# Patient Record
Sex: FEMALE | Race: BLACK OR AFRICAN AMERICAN | Employment: UNEMPLOYED | URBAN - METROPOLITAN AREA
[De-identification: names, ages, dates, MRNs, and addresses within clinical notes are randomized per-mention and may not be internally consistent; named-entity substitution may affect disease eponyms.]

---

## 2017-11-27 ENCOUNTER — HOSPITAL ENCOUNTER (EMERGENCY)
Age: 47
Discharge: HOME | End: 2017-11-27
Attending: EMERGENCY MEDICINE | Admitting: EMERGENCY MEDICINE
Payer: MEDICAID

## 2017-11-27 ENCOUNTER — APPOINTMENT (OUTPATIENT)
Dept: GENERAL RADIOLOGY | Age: 47
End: 2017-11-27
Attending: NURSE PRACTITIONER
Payer: MEDICAID

## 2017-11-27 ENCOUNTER — APPOINTMENT (OUTPATIENT)
Dept: CT IMAGING | Age: 47
End: 2017-11-27
Attending: NURSE PRACTITIONER
Payer: MEDICAID

## 2017-11-27 VITALS
HEART RATE: 70 BPM | TEMPERATURE: 97.3 F | HEIGHT: 61 IN | DIASTOLIC BLOOD PRESSURE: 97 MMHG | SYSTOLIC BLOOD PRESSURE: 147 MMHG | BODY MASS INDEX: 40.59 KG/M2 | OXYGEN SATURATION: 100 % | WEIGHT: 215 LBS | RESPIRATION RATE: 18 BRPM

## 2017-11-27 DIAGNOSIS — R03.0 ELEVATED BLOOD PRESSURE READING: ICD-10-CM

## 2017-11-27 DIAGNOSIS — R42 DIZZINESS: Primary | ICD-10-CM

## 2017-11-27 PROBLEM — E66.01 OBESITY, MORBID (HCC): Status: ACTIVE | Noted: 2017-11-27

## 2017-11-27 LAB
ANION GAP SERPL CALC-SCNC: 6 MMOL/L (ref 3–18)
APPEARANCE UR: CLEAR
BASOPHILS # BLD: 0 K/UL (ref 0–0.06)
BASOPHILS NFR BLD: 0 % (ref 0–2)
BILIRUB UR QL: NEGATIVE
BUN SERPL-MCNC: 19 MG/DL (ref 7–18)
BUN/CREAT SERPL: 23 (ref 12–20)
CALCIUM SERPL-MCNC: 9.5 MG/DL (ref 8.5–10.1)
CHLORIDE SERPL-SCNC: 103 MMOL/L (ref 100–108)
CO2 SERPL-SCNC: 29 MMOL/L (ref 21–32)
COLOR UR: YELLOW
CREAT SERPL-MCNC: 0.82 MG/DL (ref 0.6–1.3)
DIFFERENTIAL METHOD BLD: NORMAL
EOSINOPHIL # BLD: 0.1 K/UL (ref 0–0.4)
EOSINOPHIL NFR BLD: 2 % (ref 0–5)
ERYTHROCYTE [DISTWIDTH] IN BLOOD BY AUTOMATED COUNT: 14.2 % (ref 11.6–14.5)
GLUCOSE SERPL-MCNC: 81 MG/DL (ref 74–99)
GLUCOSE UR STRIP.AUTO-MCNC: NEGATIVE MG/DL
HCT VFR BLD AUTO: 41.4 % (ref 35–45)
HGB BLD-MCNC: 13.8 G/DL (ref 12–16)
HGB UR QL STRIP: NEGATIVE
KETONES UR QL STRIP.AUTO: NEGATIVE MG/DL
LEUKOCYTE ESTERASE UR QL STRIP.AUTO: NEGATIVE
LYMPHOCYTES # BLD: 2.3 K/UL (ref 0.9–3.6)
LYMPHOCYTES NFR BLD: 30 % (ref 21–52)
MCH RBC QN AUTO: 29.1 PG (ref 24–34)
MCHC RBC AUTO-ENTMCNC: 33.3 G/DL (ref 31–37)
MCV RBC AUTO: 87.2 FL (ref 74–97)
MONOCYTES # BLD: 0.5 K/UL (ref 0.05–1.2)
MONOCYTES NFR BLD: 7 % (ref 3–10)
NEUTS SEG # BLD: 4.9 K/UL (ref 1.8–8)
NEUTS SEG NFR BLD: 61 % (ref 40–73)
NITRITE UR QL STRIP.AUTO: NEGATIVE
PH UR STRIP: 5.5 [PH] (ref 5–8)
PLATELET # BLD AUTO: 233 K/UL (ref 135–420)
PMV BLD AUTO: 10.8 FL (ref 9.2–11.8)
POTASSIUM SERPL-SCNC: 3.2 MMOL/L (ref 3.5–5.5)
PROT UR STRIP-MCNC: NEGATIVE MG/DL
RBC # BLD AUTO: 4.75 M/UL (ref 4.2–5.3)
SODIUM SERPL-SCNC: 138 MMOL/L (ref 136–145)
SP GR UR REFRACTOMETRY: 1.01 (ref 1–1.03)
UROBILINOGEN UR QL STRIP.AUTO: 0.2 EU/DL (ref 0.2–1)
WBC # BLD AUTO: 7.9 K/UL (ref 4.6–13.2)

## 2017-11-27 PROCEDURE — 85025 COMPLETE CBC W/AUTO DIFF WBC: CPT | Performed by: NURSE PRACTITIONER

## 2017-11-27 PROCEDURE — 96374 THER/PROPH/DIAG INJ IV PUSH: CPT

## 2017-11-27 PROCEDURE — 99285 EMERGENCY DEPT VISIT HI MDM: CPT

## 2017-11-27 PROCEDURE — 70450 CT HEAD/BRAIN W/O DYE: CPT

## 2017-11-27 PROCEDURE — 74011000258 HC RX REV CODE- 258: Performed by: NURSE PRACTITIONER

## 2017-11-27 PROCEDURE — 96361 HYDRATE IV INFUSION ADD-ON: CPT

## 2017-11-27 PROCEDURE — 74011250636 HC RX REV CODE- 250/636: Performed by: NURSE PRACTITIONER

## 2017-11-27 PROCEDURE — 81003 URINALYSIS AUTO W/O SCOPE: CPT | Performed by: NURSE PRACTITIONER

## 2017-11-27 PROCEDURE — 74011250637 HC RX REV CODE- 250/637: Performed by: NURSE PRACTITIONER

## 2017-11-27 PROCEDURE — 80048 BASIC METABOLIC PNL TOTAL CA: CPT | Performed by: NURSE PRACTITIONER

## 2017-11-27 PROCEDURE — 71010 XR CHEST PORT: CPT

## 2017-11-27 RX ORDER — MECLIZINE HYDROCHLORIDE 25 MG/1
25 TABLET ORAL
Qty: 12 TAB | Refills: 0 | Status: SHIPPED | OUTPATIENT
Start: 2017-11-27 | End: 2017-12-07

## 2017-11-27 RX ORDER — MECLIZINE HCL 12.5 MG 12.5 MG/1
25 TABLET ORAL
Status: COMPLETED | OUTPATIENT
Start: 2017-11-27 | End: 2017-11-27

## 2017-11-27 RX ORDER — POTASSIUM CHLORIDE 20 MEQ/1
60 TABLET, EXTENDED RELEASE ORAL
Status: COMPLETED | OUTPATIENT
Start: 2017-11-27 | End: 2017-11-27

## 2017-11-27 RX ORDER — ONDANSETRON 2 MG/ML
4 INJECTION INTRAMUSCULAR; INTRAVENOUS
Status: DISCONTINUED | OUTPATIENT
Start: 2017-11-27 | End: 2017-11-27

## 2017-11-27 RX ORDER — DIAZEPAM 10 MG/2ML
5 INJECTION INTRAMUSCULAR
Status: DISCONTINUED | OUTPATIENT
Start: 2017-11-27 | End: 2017-11-27

## 2017-11-27 RX ORDER — POTASSIUM CHLORIDE 20 MEQ/1
TABLET, EXTENDED RELEASE ORAL
Status: DISCONTINUED
Start: 2017-11-27 | End: 2017-11-27 | Stop reason: HOSPADM

## 2017-11-27 RX ADMIN — SODIUM CHLORIDE 1000 ML: 900 INJECTION, SOLUTION INTRAVENOUS at 10:12

## 2017-11-27 RX ADMIN — SODIUM CHLORIDE 1000 ML: 900 INJECTION, SOLUTION INTRAVENOUS at 07:45

## 2017-11-27 RX ADMIN — MECLIZINE 25 MG: 12.5 TABLET ORAL at 07:50

## 2017-11-27 RX ADMIN — POTASSIUM CHLORIDE 60 MEQ: 1500 TABLET, FILM COATED, EXTENDED RELEASE ORAL at 09:12

## 2017-11-27 RX ADMIN — PROMETHAZINE HYDROCHLORIDE 25 MG: 25 INJECTION INTRAMUSCULAR; INTRAVENOUS at 07:47

## 2017-11-27 NOTE — ED TRIAGE NOTES
Presents with dizziness, nausea, abdominal pain, chest pain and dysuria. Onset abdominal pain and chest pain this am.  Onset dizziness and dysuria one week.

## 2017-11-27 NOTE — DISCHARGE INSTRUCTIONS
[facility] Activation    Thank you for requesting access to [facility]. Please follow the instructions below to securely access and download your online medical record. [facility] allows you to send messages to your doctor, view your test results, renew your prescriptions, schedule appointments, and more. How Do I Sign Up? 1. In your internet browser, go to [email or web address]  2. Click on the First Time User? Click Here link in the Sign In box. You will be redirect to the New Member Sign Up page. 3. Enter your [facility] Access Code exactly as it appears below. You will not need to use this code after youve completed the sign-up process. If you do not sign up before the expiration date, you must request a new code. [facility] Access Code: [email or web address]  Expires: 2018 11:05 AM (This is the date your [facility] access code will )    4. Enter the last four digits of your Social Security Number (xxxx) and Date of Birth (mm/dd/yyyy) as indicated and click Submit. You will be taken to the next sign-up page. 5. Create a [facility] ID. This will be your [facility] login ID and cannot be changed, so think of one that is secure and easy to remember. 6. Create a [facility] password. You can change your password at any time. 7. Enter your Password Reset Question and Answer. This can be used at a later time if you forget your password. 8. Enter your e-mail address. You will receive e-mail notification when new information is available in [address]. 9. Click Sign Up. You can now view and download portions of your medical record. 10. Click the Download Summary menu link to download a portable copy of your medical information. Additional Information    If you have questions, please visit the Frequently Asked Questions section of the [employer] website at [email or web address] Remember, [facility] is NOT to be used for urgent needs. For medical emergencies, dial 911. Keep well hydrated.   Drink at least 2 liters of water daily. Follow up with your physician or the referal as provided by calling today for an appointment.

## 2017-11-27 NOTE — ED PROVIDER NOTES
HPI Comments: Amrita Jimenez is a 56years old female who presents to the ED with a c/o extreme dizziness over the past week. She has had several bouts of N&V, but denies protracted emesis. States she was in [place] visiting when it began, and thought it would go away when she came back to [place], but it has continued. She has not self-medicated this problem. Nothing has made it better or worse. Patient is a 56 y.o. female presenting with abdominal pain. The history is provided by the patient. History limited by: No communication barrier. Abdominal Pain    This is a new problem. Episode onset: one week. The problem occurs constantly. The problem has not changed since onset. Associated with: Pt makes no association. The pain is mild. Associated symptoms include nausea and vomiting. Pertinent negatives include no diarrhea. Nothing worsens the pain. The pain is relieved by nothing. Past Medical History:   Diagnosis Date    Hypertension        History reviewed. No pertinent surgical history. History reviewed. No pertinent family history. Social History     Social History    Marital status:      Spouse name: N/A    Number of children: N/A    Years of education: N/A     Occupational History    Not on file. Social History Main Topics    Smoking status: Former Smoker    Smokeless tobacco: Never Used    Alcohol use No    Drug use: No    Sexual activity: Not on file     Other Topics Concern    Not on file     Social History Narrative    No narrative on file         ALLERGIES: Ibuprofen and Tramadol    Review of Systems   Constitutional: Negative. HENT: Negative. Eyes: Negative. Respiratory: Positive for cough (productive). Cardiovascular: Negative. Gastrointestinal: Positive for abdominal pain, nausea and vomiting. Negative for diarrhea. Endocrine: Negative. Genitourinary: Negative. Musculoskeletal: Negative. Skin: Negative. Allergic/Immunologic: Negative. Neurological: Negative. Hematological: Negative. Psychiatric/Behavioral: Negative. Vitals:    11/27/17 0650 11/27/17 0652   BP:  (!) 143/91   Pulse: 63    Resp: 18    Temp: 97.3 °F (36.3 °C)    SpO2: 100%    Weight: 97.5 kg (215 lb)    Height: 5' 1\" (1.549 m)             Physical Exam   Constitutional: She is oriented to person, place, and time. She appears well-developed and well-nourished. No distress. HENT:   Head: Normocephalic and atraumatic. Eyes: EOM are normal. Pupils are equal, round, and reactive to light. Neck: Normal range of motion. Neck supple. Cardiovascular: Normal rate, regular rhythm, normal heart sounds and intact distal pulses. Pulmonary/Chest: Effort normal. No respiratory distress. She has wheezes. She has no rales. Abdominal: Soft. Bowel sounds are normal. She exhibits no distension. There is tenderness. There is no rebound and no guarding. Genitourinary:   Genitourinary Comments: NE   Musculoskeletal: Normal range of motion. Neurological: She is alert and oriented to person, place, and time. No cranial nerve deficit. Coordination normal.   Skin: Skin is warm and dry. Psychiatric: She has a normal mood and affect. Nursing note and vitals reviewed. MDM  Number of Diagnoses or Management Options  Dizziness:   Elevated blood pressure reading:   Diagnosis management comments: PROGRESS NOTE:  One or more blood pressure readings were noted elevated during the Pt's presentation in the emergency department this date. This abnormal reading has been cited in the Pt's diagnosis, and they have been encouraged to follow up with their primary care physician, or referred to a consultant for further evaluation and treatment.     Conchita Andersen NP   9:11 AM             Amount and/or Complexity of Data Reviewed  Clinical lab tests: ordered and reviewed  Tests in the radiology section of CPT®: ordered and reviewed  Independent visualization of images, tracings, or specimens: yes (cxr  )    Risk of Complications, Morbidity, and/or Mortality  Presenting problems: moderate  Diagnostic procedures: moderate  Management options: moderate      ED Course       Procedures    Diagnosis:   1. Dizziness    2. Elevated blood pressure reading          Disposition:   Discharged to Home. Follow-up Information     Follow up With Details Comments Contact Info    [facility]   [address] [id number] [phone]          Patient's Medications   Start Taking    MECLIZINE (ANTIVERT) 25 MG TABLET    Take 1 Tab by mouth every eight (8) hours as needed for up to 10 days.    Continue Taking    No medications on file   These Medications have changed    No medications on file   Stop Taking    No medications on file

## 2022-03-18 PROBLEM — E66.01 OBESITY, MORBID (HCC): Status: ACTIVE | Noted: 2017-11-27

## 2024-03-27 ENCOUNTER — OFFICE VISIT (OUTPATIENT)
Facility: CLINIC | Age: 54
End: 2024-03-27
Payer: MEDICAID

## 2024-03-27 VITALS
SYSTOLIC BLOOD PRESSURE: 110 MMHG | TEMPERATURE: 97.7 F | HEART RATE: 82 BPM | OXYGEN SATURATION: 96 % | HEIGHT: 62 IN | BODY MASS INDEX: 43.61 KG/M2 | DIASTOLIC BLOOD PRESSURE: 76 MMHG | RESPIRATION RATE: 16 BRPM | WEIGHT: 237 LBS

## 2024-03-27 DIAGNOSIS — Z12.11 COLON CANCER SCREENING: ICD-10-CM

## 2024-03-27 DIAGNOSIS — E66.01 OBESITY, MORBID (HCC): ICD-10-CM

## 2024-03-27 DIAGNOSIS — Z83.2 FAMILY HISTORY OF AUTOIMMUNE DISORDER: ICD-10-CM

## 2024-03-27 DIAGNOSIS — Z76.89 ENCOUNTER TO ESTABLISH CARE: Primary | ICD-10-CM

## 2024-03-27 DIAGNOSIS — E56.9 VITAMIN DEFICIENCY: ICD-10-CM

## 2024-03-27 DIAGNOSIS — R73.01 IMPAIRED FASTING GLUCOSE: ICD-10-CM

## 2024-03-27 DIAGNOSIS — Z13.220 SCREENING FOR LIPID DISORDERS: ICD-10-CM

## 2024-03-27 DIAGNOSIS — L73.2 HIDRADENITIS SUPPURATIVA: ICD-10-CM

## 2024-03-27 DIAGNOSIS — R80.9 MICROALBUMINURIA: ICD-10-CM

## 2024-03-27 PROCEDURE — 99204 OFFICE O/P NEW MOD 45 MIN: CPT

## 2024-03-27 SDOH — ECONOMIC STABILITY: FOOD INSECURITY: WITHIN THE PAST 12 MONTHS, YOU WORRIED THAT YOUR FOOD WOULD RUN OUT BEFORE YOU GOT MONEY TO BUY MORE.: NEVER TRUE

## 2024-03-27 SDOH — ECONOMIC STABILITY: FOOD INSECURITY: WITHIN THE PAST 12 MONTHS, THE FOOD YOU BOUGHT JUST DIDN'T LAST AND YOU DIDN'T HAVE MONEY TO GET MORE.: NEVER TRUE

## 2024-03-27 SDOH — ECONOMIC STABILITY: HOUSING INSECURITY
IN THE LAST 12 MONTHS, WAS THERE A TIME WHEN YOU DID NOT HAVE A STEADY PLACE TO SLEEP OR SLEPT IN A SHELTER (INCLUDING NOW)?: NO

## 2024-03-27 SDOH — ECONOMIC STABILITY: INCOME INSECURITY: HOW HARD IS IT FOR YOU TO PAY FOR THE VERY BASICS LIKE FOOD, HOUSING, MEDICAL CARE, AND HEATING?: NOT HARD AT ALL

## 2024-03-27 ASSESSMENT — PATIENT HEALTH QUESTIONNAIRE - PHQ9
SUM OF ALL RESPONSES TO PHQ9 QUESTIONS 1 & 2: 2
6. FEELING BAD ABOUT YOURSELF - OR THAT YOU ARE A FAILURE OR HAVE LET YOURSELF OR YOUR FAMILY DOWN: SEVERAL DAYS
5. POOR APPETITE OR OVEREATING: SEVERAL DAYS
7. TROUBLE CONCENTRATING ON THINGS, SUCH AS READING THE NEWSPAPER OR WATCHING TELEVISION: SEVERAL DAYS
10. IF YOU CHECKED OFF ANY PROBLEMS, HOW DIFFICULT HAVE THESE PROBLEMS MADE IT FOR YOU TO DO YOUR WORK, TAKE CARE OF THINGS AT HOME, OR GET ALONG WITH OTHER PEOPLE: VERY DIFFICULT
SUM OF ALL RESPONSES TO PHQ QUESTIONS 1-9: 10
1. LITTLE INTEREST OR PLEASURE IN DOING THINGS: SEVERAL DAYS
9. THOUGHTS THAT YOU WOULD BE BETTER OFF DEAD, OR OF HURTING YOURSELF: NOT AT ALL
SUM OF ALL RESPONSES TO PHQ QUESTIONS 1-9: 10
3. TROUBLE FALLING OR STAYING ASLEEP: MORE THAN HALF THE DAYS
SUM OF ALL RESPONSES TO PHQ QUESTIONS 1-9: 10
4. FEELING TIRED OR HAVING LITTLE ENERGY: MORE THAN HALF THE DAYS
SUM OF ALL RESPONSES TO PHQ QUESTIONS 1-9: 10
8. MOVING OR SPEAKING SO SLOWLY THAT OTHER PEOPLE COULD HAVE NOTICED. OR THE OPPOSITE, BEING SO FIGETY OR RESTLESS THAT YOU HAVE BEEN MOVING AROUND A LOT MORE THAN USUAL: SEVERAL DAYS
2. FEELING DOWN, DEPRESSED OR HOPELESS: SEVERAL DAYS

## 2024-03-27 ASSESSMENT — ANXIETY QUESTIONNAIRES
IF YOU CHECKED OFF ANY PROBLEMS ON THIS QUESTIONNAIRE, HOW DIFFICULT HAVE THESE PROBLEMS MADE IT FOR YOU TO DO YOUR WORK, TAKE CARE OF THINGS AT HOME, OR GET ALONG WITH OTHER PEOPLE: EXTREMELY DIFFICULT
6. BECOMING EASILY ANNOYED OR IRRITABLE: NEARLY EVERY DAY
4. TROUBLE RELAXING: NEARLY EVERY DAY
2. NOT BEING ABLE TO STOP OR CONTROL WORRYING: NEARLY EVERY DAY
GAD7 TOTAL SCORE: 20
1. FEELING NERVOUS, ANXIOUS, OR ON EDGE: NEARLY EVERY DAY
3. WORRYING TOO MUCH ABOUT DIFFERENT THINGS: NEARLY EVERY DAY
5. BEING SO RESTLESS THAT IT IS HARD TO SIT STILL: NEARLY EVERY DAY
7. FEELING AFRAID AS IF SOMETHING AWFUL MIGHT HAPPEN: MORE THAN HALF THE DAYS

## 2024-03-27 ASSESSMENT — COLUMBIA-SUICIDE SEVERITY RATING SCALE - C-SSRS
2. HAVE YOU ACTUALLY HAD ANY THOUGHTS OF KILLING YOURSELF?: NO
1. WITHIN THE PAST MONTH, HAVE YOU WISHED YOU WERE DEAD OR WISHED YOU COULD GO TO SLEEP AND NOT WAKE UP?: NO
6. HAVE YOU EVER DONE ANYTHING, STARTED TO DO ANYTHING, OR PREPARED TO DO ANYTHING TO END YOUR LIFE?: NO

## 2024-03-27 NOTE — PROGRESS NOTES
Mandy Edwards is a 54 y.o. year old female who presents in office today for   Chief Complaint   Patient presents with    Establish Care       Is someone accompanying this pt? YES, DAUGHTER, BOTH HAVE APPTS    Is the patient using any DME equipment during OV? NO    Depression Screening:        No data to display                Abuse Screening:      3/27/2024     2:00 PM   AMB Abuse Screening   Do you ever feel afraid of your partner? N   Are you in a relationship with someone who physically or mentally threatens you? N   Is it safe for you to go home? Y       Learning Assessment:  Who is the primary learner? Patient    What is the preferred language for health care of the primary learner? ENGLISH    How does the primary learner prefer to learn new concepts? DEMONSTRATION    How does the primary learner prefer to learn new concepts? OTHER (COMMENT) HANDS ON   Answered By PATIENT    Relationship to Learner SELF    Highest level of education completed by primary learner? DID NOT GRADUATE HIGH SCHOOL    Are there any barriers / factors that could impact learning? NONE    Will there be a co-learner / caregiver? No        Fall Risk:       No data to display                    Coordination of Care:   1. \"Have you been to the ER, urgent care clinic since your last visit?  Hospitalized since your last visit?\" NA    2. \"Have you seen or consulted any other health care providers outside of the [facility] since your last visit?\" NO    3. For patients aged 45-75: Has the patient had a colonoscopy / FIT/ Cologuard? DUE    If the patient is female:    4. For patients aged 40-74: Has the patient had a mammogram within the past 2 years? DUE    5. For patients aged 21-65: Has the patient had a pap smear? DUE    Health Maintenance: reviewed and discussed and ordered per Provider.    Health Maintenance Due   Topic Date Due    Hepatitis B vaccine (1 of 3 - 3-dose series) Never done    COVID-19 Vaccine (1) Never done

## 2024-03-27 NOTE — PROGRESS NOTES
Patient ID: Mandy Edwards is a 54 y.o. female new patient presents for the following:      Subjective:     Mandy Edwards presents to establish care with new PCP.  She does have HS which is being managed by dermatology.         No past medical history on file.    Past Surgical History:   Procedure Laterality Date    HYSTERECTOMY, TOTAL ABDOMINAL (CERVIX REMOVED)  2018       No current outpatient medications on file.     No current facility-administered medications for this visit.          ROS   Review of Systems   Constitutional:  Negative for chills, fatigue and fever.   HENT:  Negative for ear pain, hearing loss, sore throat and trouble swallowing.    Eyes: Negative.    Respiratory:  Negative for cough, chest tightness, shortness of breath and wheezing.    Cardiovascular:  Negative for chest pain, palpitations and leg swelling.   Gastrointestinal:  Negative for abdominal pain, blood in stool, constipation, diarrhea, nausea and vomiting.   Endocrine: Negative for polydipsia, polyphagia and polyuria.   Genitourinary:  Negative for flank pain, hematuria, pelvic pain, vaginal bleeding, vaginal discharge and vaginal pain.   Skin: Negative.    Neurological:  Negative for dizziness, syncope, weakness and numbness.   Psychiatric/Behavioral:  Negative for confusion, decreased concentration, self-injury, sleep disturbance and suicidal ideas. The patient is not nervous/anxious.              Objective:  Vitals:    03/27/24 1427   BP: 110/76   Site: Right Upper Arm   Position: Sitting   Cuff Size: Large Adult   Pulse: 82   Resp: 16   Temp: 97.7 °F (36.5 °C)   TempSrc: Temporal   SpO2: 96%   Weight: 107.5 kg (237 lb)   Height: 1.562 m (5' 1.5\")         Physical Exam  Vitals reviewed.   Constitutional:       General: She is not in acute distress.     Appearance: Normal appearance. She is normal weight. She is not ill-appearing or toxic-appearing.   HENT:      Head: Normocephalic and atraumatic.      Right Ear: Tympanic

## 2024-03-28 LAB
A/G RATIO: 1.1 RATIO (ref 1.1–2.6)
ALBUMIN SERPL-MCNC: 4.3 G/DL (ref 3.5–5)
ALP BLD-CCNC: 113 U/L (ref 25–115)
ALT SERPL-CCNC: 15 U/L (ref 5–40)
ANION GAP SERPL CALCULATED.3IONS-SCNC: 13 MMOL/L (ref 3–15)
ANTI-DNA (DS) AB QN: 3 IU/ML
AST SERPL-CCNC: 20 U/L (ref 10–37)
BACTERIA: PRESENT
BASOPHILS # BLD: 0 % (ref 0–2)
BASOPHILS ABSOLUTE: 0 K/UL (ref 0–0.2)
BILIRUB SERPL-MCNC: 0.3 MG/DL (ref 0.2–1.2)
BILIRUB SERPL-MCNC: NEGATIVE MG/DL
BLOOD: NEGATIVE
BUN BLDV-MCNC: 18 MG/DL (ref 6–22)
CALCIUM SERPL-MCNC: 9.7 MG/DL (ref 8.4–10.5)
CENTROMERE B AB: <0.2 AI
CHLORIDE BLD-SCNC: 100 MMOL/L (ref 98–110)
CHOLESTEROL/HDL RATIO: 3.2 (ref 0–5)
CHOLESTEROL: 200 MG/DL (ref 110–200)
CHROMATIN ANTIBODY: <0.2 AI
CLARITY: CLEAR
CO2: 30 MMOL/L (ref 20–32)
COLOR: YELLOW
CREAT SERPL-MCNC: 0.8 MG/DL (ref 0.5–1.2)
CREATININE URINE: 149 MG/DL
CREATININE URINE: 149 MG/DL
EOSINOPHIL # BLD: 1 % (ref 0–6)
EOSINOPHILS ABSOLUTE: 0.1 K/UL (ref 0–0.5)
EPITHELIAL CELLS: ABNORMAL /HPF
ESTIMATED AVERAGE GLUCOSE: 118 MG/DL (ref 91–123)
GLOBULIN: 3.8 G/DL (ref 2–4)
GLOMERULAR FILTRATION RATE: >60 ML/MIN/1.73 SQ.M.
GLUCOSE: 99 MG/DL (ref 70–99)
GLUCOSE: NEGATIVE MG/DL
HBA1C MFR BLD: 5.7 % (ref 4.8–5.6)
HCT VFR BLD CALC: 43.7 % (ref 35.1–48)
HDLC SERPL-MCNC: 62 MG/DL
HEMOGLOBIN: 13.9 G/DL (ref 11.7–16)
HYALINE CASTS: ABNORMAL /LPF (ref 0–2)
JO-1 ANTIBODY: <0.2 AI
KETONES, URINE: NEGATIVE MG/DL
LDL CHOLESTEROL CALCULATED: 119 MG/DL (ref 50–99)
LDL/HDL RATIO: 1.9
LEUKOCYTE ESTERASE, URINE: NEGATIVE
LYMPHOCYTES # BLD: 24 % (ref 20–45)
LYMPHOCYTES ABSOLUTE: 2 K/UL (ref 1–4.8)
MCH RBC QN AUTO: 30 PG (ref 26–34)
MCHC RBC AUTO-ENTMCNC: 32 G/DL (ref 31–36)
MCV RBC AUTO: 93 FL (ref 80–99)
MICROALB/CREAT RATIO (UG/MG CREAT.): 25.6 (ref 0–30)
MICROALBUMIN/CREAT 24H UR: 38.2 MG/L (ref 0.1–17)
MONOCYTES ABSOLUTE: 0.5 K/UL (ref 0.1–1)
MONOCYTES: 6 % (ref 3–12)
NEUTROPHILS ABSOLUTE: 5.8 K/UL (ref 1.8–7.7)
NEUTROPHILS: 68 % (ref 40–75)
NITRITE, URINE: NEGATIVE
NON-HDL CHOLESTEROL: 138 MG/DL
PDW BLD-RTO: 14 % (ref 10–15.5)
PH, URINE: 5.5 PH (ref 5–8)
PLATELET # BLD: 273 K/UL (ref 140–440)
PMV BLD AUTO: 11.6 FL (ref 9–13)
POTASSIUM SERPL-SCNC: 3.6 MMOL/L (ref 3.5–5.5)
PROTEIN UA: NEGATIVE MG/DL
RBC URINE: ABNORMAL /HPF
RBC: 4.71 M/UL (ref 3.8–5.2)
RNP ANTIBODY: <0.2 AI
SCLERODERMA (SCL-70) AB: 0.2 AI
SJOGREN'S ANTI-SS-A: <0.2 AI
SJOGREN'S ANTI-SS-B: <0.2 AI
SMITH ABS: <0.2 AI
SODIUM BLD-SCNC: 143 MMOL/L (ref 133–145)
SPECIFIC GRAVITY: 1.02 (ref 1–1.03)
TOTAL PROTEIN: 8.1 G/DL (ref 6.4–8.3)
TRIGL SERPL-MCNC: 91 MG/DL (ref 40–149)
TSH SERPL DL<=0.05 MIU/L-ACNC: 1.68 MCU/ML (ref 0.27–4.2)
UROBILINOGEN: 0.2 MG/DL
VITAMIN D 25-HYDROXY: 42.4 NG/ML (ref 32–100)
VLDLC SERPL CALC-MCNC: 18 MG/DL (ref 8–30)
WBC UA: ABNORMAL /HPF (ref 0–5)
WBC: 8.5 K/UL (ref 4–11)

## 2024-04-04 ASSESSMENT — ENCOUNTER SYMPTOMS
EYES NEGATIVE: 1
BLOOD IN STOOL: 0
DIARRHEA: 0
VOMITING: 0
WHEEZING: 0
NAUSEA: 0
CHEST TIGHTNESS: 0
COUGH: 0
SHORTNESS OF BREATH: 0
ABDOMINAL PAIN: 0
SORE THROAT: 0
CONSTIPATION: 0

## 2024-06-06 ENCOUNTER — OFFICE VISIT (OUTPATIENT)
Facility: CLINIC | Age: 54
End: 2024-06-06
Payer: MEDICAID

## 2024-06-06 VITALS
RESPIRATION RATE: 16 BRPM | WEIGHT: 237 LBS | DIASTOLIC BLOOD PRESSURE: 103 MMHG | OXYGEN SATURATION: 94 % | HEIGHT: 62 IN | BODY MASS INDEX: 43.61 KG/M2 | TEMPERATURE: 98.1 F | SYSTOLIC BLOOD PRESSURE: 177 MMHG | HEART RATE: 84 BPM

## 2024-06-06 DIAGNOSIS — L73.2 HIDRADENITIS SUPPURATIVA: ICD-10-CM

## 2024-06-06 DIAGNOSIS — R73.01 IMPAIRED FASTING GLUCOSE: ICD-10-CM

## 2024-06-06 DIAGNOSIS — M25.50 PAIN IN JOINT, MULTIPLE SITES: ICD-10-CM

## 2024-06-06 DIAGNOSIS — E56.9 VITAMIN DEFICIENCY: ICD-10-CM

## 2024-06-06 DIAGNOSIS — R52 ACUTE PAIN: ICD-10-CM

## 2024-06-06 DIAGNOSIS — Z91.89 AT HIGH RISK FOR BREAST CANCER: ICD-10-CM

## 2024-06-06 DIAGNOSIS — Z53.20 MAMMOGRAM DECLINED: ICD-10-CM

## 2024-06-06 DIAGNOSIS — Z00.00 PREVENTATIVE HEALTH CARE: Primary | ICD-10-CM

## 2024-06-06 DIAGNOSIS — R80.9 MICROALBUMINURIA: ICD-10-CM

## 2024-06-06 DIAGNOSIS — E55.9 VITAMIN D DEFICIENCY: ICD-10-CM

## 2024-06-06 PROCEDURE — 99214 OFFICE O/P EST MOD 30 MIN: CPT

## 2024-06-06 RX ORDER — NALOXONE HYDROCHLORIDE 4 MG/.1ML
1 SPRAY NASAL PRN
Qty: 2 EACH | Refills: 0 | Status: SHIPPED | OUTPATIENT
Start: 2024-06-06

## 2024-06-06 RX ORDER — SULFAMETHOXAZOLE AND TRIMETHOPRIM 800; 160 MG/1; MG/1
1 TABLET ORAL 2 TIMES DAILY
Qty: 14 TABLET | Refills: 0 | Status: SHIPPED | OUTPATIENT
Start: 2024-06-06 | End: 2024-06-13

## 2024-06-06 RX ORDER — LISINOPRIL 20 MG/1
20 TABLET ORAL DAILY
COMMUNITY
Start: 2023-06-29

## 2024-06-06 RX ORDER — ALPRAZOLAM 0.5 MG/1
TABLET ORAL
COMMUNITY
Start: 2024-05-30

## 2024-06-06 RX ORDER — FLUTICASONE PROPIONATE 50 MCG
1 SPRAY, SUSPENSION (ML) NASAL DAILY PRN
COMMUNITY
Start: 2024-02-16

## 2024-06-06 RX ORDER — ESCITALOPRAM OXALATE 10 MG/1
TABLET ORAL
COMMUNITY
Start: 2024-05-23

## 2024-06-06 RX ORDER — OXYCODONE HYDROCHLORIDE 5 MG/1
5 TABLET ORAL EVERY 6 HOURS PRN
Qty: 12 TABLET | Refills: 0 | Status: SHIPPED | OUTPATIENT
Start: 2024-06-06 | End: 2024-06-09

## 2024-06-06 RX ORDER — BUSPIRONE HYDROCHLORIDE 10 MG/1
TABLET ORAL
COMMUNITY
Start: 2024-05-27

## 2024-06-06 RX ORDER — ERYTHROMYCIN 5 MG/G
OINTMENT OPHTHALMIC
COMMUNITY
Start: 2024-05-30

## 2024-06-06 RX ORDER — MOXIFLOXACIN 5 MG/ML
SOLUTION/ DROPS OPHTHALMIC
COMMUNITY
Start: 2024-05-30

## 2024-06-06 SDOH — ECONOMIC STABILITY: FOOD INSECURITY: WITHIN THE PAST 12 MONTHS, YOU WORRIED THAT YOUR FOOD WOULD RUN OUT BEFORE YOU GOT MONEY TO BUY MORE.: NEVER TRUE

## 2024-06-06 SDOH — ECONOMIC STABILITY: FOOD INSECURITY: WITHIN THE PAST 12 MONTHS, THE FOOD YOU BOUGHT JUST DIDN'T LAST AND YOU DIDN'T HAVE MONEY TO GET MORE.: NEVER TRUE

## 2024-06-06 SDOH — ECONOMIC STABILITY: INCOME INSECURITY: HOW HARD IS IT FOR YOU TO PAY FOR THE VERY BASICS LIKE FOOD, HOUSING, MEDICAL CARE, AND HEATING?: NOT HARD AT ALL

## 2024-06-06 ASSESSMENT — PATIENT HEALTH QUESTIONNAIRE - PHQ9
SUM OF ALL RESPONSES TO PHQ QUESTIONS 1-9: 2
2. FEELING DOWN, DEPRESSED OR HOPELESS: SEVERAL DAYS
SUM OF ALL RESPONSES TO PHQ9 QUESTIONS 1 & 2: 2
SUM OF ALL RESPONSES TO PHQ QUESTIONS 1-9: 2
1. LITTLE INTEREST OR PLEASURE IN DOING THINGS: SEVERAL DAYS

## 2024-06-06 NOTE — PROGRESS NOTES
Mandy Edwards is a 54 y.o. year old female who presents in office today for   Chief Complaint   Patient presents with    Annual Exam    Discuss Labs    Breast Pain    Arm Pain    Knee Pain     both       Is someone accompanying this pt? Yes daughter both have appts    Is the patient using any DME equipment during OV? no    Depression Screenin/6/2024     3:40 PM 3/27/2024     2:48 PM   PHQ-9 Questionaire   Little interest or pleasure in doing things 1 1   Feeling down, depressed, or hopeless 1 1   Trouble falling or staying asleep, or sleeping too much  2   Feeling tired or having little energy  2   Poor appetite or overeating  1   Feeling bad about yourself - or that you are a failure or have let yourself or your family down  1   Trouble concentrating on things, such as reading the newspaper or watching television  1   Moving or speaking so slowly that other people could have noticed. Or the opposite - being so fidgety or restless that you have been moving around a lot more than usual  1   Thoughts that you would be better off dead, or of hurting yourself in some way  0   PHQ-9 Total Score 2 10   If you checked off any problems, how difficult have these problems made it for you to do your work, take care of things at home, or get along with other people?  2       Abuse Screenin/6/2024     3:00 PM 3/27/2024     2:00 PM   AMB Abuse Screening   Do you ever feel afraid of your partner? N N   Are you in a relationship with someone who physically or mentally threatens you? N N   Is it safe for you to go home? Y Y       Learning Assessment:  No question data found.    Fall Risk:       No data to display                    Coordination of Care:   1. \"Have you been to the ER, urgent care clinic since your last visit?  Hospitalized since your last visit?\" no    2. \"Have you seen or consulted any other health care providers outside of the [facility] since your last visit?\" no    3. For 
Results   Component Value Date/Time    VITD25 42.4 03/27/2024 03:25 AM                  Return in about 2 weeks (around 6/20/2024) for HTN and HS.          On this date 06/06/2024  I have reviewed previous notes, test results and face to face with the patient discussing the diagnosis and treatment plan as well as documenting the visit. The patient has received an After-Visit Summary for today's visit.       Electronically signed  Carmen Schafer Family Nurse Practitioner     Please note: This document has been created using a voice recognition software. Unrecognized errors in transcription may be present.

## 2024-06-25 PROBLEM — Z00.00 PREVENTATIVE HEALTH CARE: Status: ACTIVE | Noted: 2024-06-25

## 2024-06-25 ASSESSMENT — ENCOUNTER SYMPTOMS
EYES NEGATIVE: 1
WHEEZING: 0
CHEST TIGHTNESS: 0
TROUBLE SWALLOWING: 0
COUGH: 0
SORE THROAT: 0
DIARRHEA: 0
NAUSEA: 0
SHORTNESS OF BREATH: 0
ABDOMINAL PAIN: 0
VOMITING: 0
CONSTIPATION: 0
BLOOD IN STOOL: 0

## 2024-06-25 NOTE — ASSESSMENT & PLAN NOTE
Mammogram- patient was educated concerning breast cancer screening. She refused mammogram  Colon cancer screening- Referred to GI

## 2024-06-25 NOTE — ASSESSMENT & PLAN NOTE
Hemoglobin A1C   Date Value Ref Range Status   03/27/2024 5.7 (H) 4.8 - 5.6 % Final    Impaired fasting glucose:  - If you have a diagnosis of impaired fasting glucose, or prediabetes, this means that you are at higher risk of developing diabetes  - Biannual A1C check if remaining stable and well controlled.  - Lifestyle modifications include eating a diet that has lots of vegetables, fruits, beans, nuts, and whole grains. Limits red and processed meats, unhealthy fats, sugar, salt, and alcohol.  - Increase your exercise  - If you get symptoms of unexplained drowsiness, frequent urination, constant hunger, or increased thirst, please notify your provider.

## 2024-06-25 NOTE — ASSESSMENT & PLAN NOTE
Improved. Continue current regimen   Lab Results   Component Value Date/Time    VITD25 42.4 03/27/2024 03:25 AM

## 2024-06-25 NOTE — ASSESSMENT & PLAN NOTE
Lab Results   Component Value Date    [facility] 25.6 03/27/2024   - BP not at goal

## 2024-07-25 PROBLEM — Z00.00 PREVENTATIVE HEALTH CARE: Status: RESOLVED | Noted: 2024-06-25 | Resolved: 2024-07-25

## 2024-07-31 ENCOUNTER — OFFICE VISIT (OUTPATIENT)
Facility: CLINIC | Age: 54
End: 2024-07-31
Payer: MEDICAID

## 2024-07-31 VITALS
TEMPERATURE: 97.4 F | DIASTOLIC BLOOD PRESSURE: 89 MMHG | SYSTOLIC BLOOD PRESSURE: 134 MMHG | HEART RATE: 92 BPM | OXYGEN SATURATION: 98 % | RESPIRATION RATE: 20 BRPM | WEIGHT: 231.6 LBS | BODY MASS INDEX: 42.62 KG/M2 | HEIGHT: 62 IN

## 2024-07-31 DIAGNOSIS — L73.2 HIDRADENITIS SUPPURATIVA: Primary | ICD-10-CM

## 2024-07-31 PROCEDURE — 99212 OFFICE O/P EST SF 10 MIN: CPT

## 2024-07-31 NOTE — PROGRESS NOTES
Patient ID: Mandy Edwards is a 54 y.o. female established patient presents for the following:      Subjective:     Primary historian: patient    6 Month Follow-Up       She presents for follow up of chronic conditions. She still experiences frequent HS flares and is managed by dermatology. She currently has draining cyst on left breast, denies erythema, swelling, or fevers.            No past medical history on file.    Past Surgical History:   Procedure Laterality Date    HYSTERECTOMY, TOTAL ABDOMINAL (CERVIX REMOVED)  2018       Current Outpatient Medications   Medication Sig Dispense Refill    busPIRone (BUSPAR) 10 MG tablet       ALPRAZolam (XANAX) 0.5 MG tablet       erythromycin (ROMYCIN) 5 MG/GM ophthalmic ointment       fluticasone (FLONASE) 50 MCG/ACT nasal spray 1 spray daily as needed for Allergies      lisinopril (PRINIVIL;ZESTRIL) 20 MG tablet Take 1 tablet by mouth daily      moxifloxacin (VIGAMOX) 0.5 % ophthalmic solution       escitalopram (LEXAPRO) 10 MG tablet       naloxone 4 MG/0.1ML LIQD nasal spray 1 spray by Nasal route as needed for Opioid Reversal 2 each 0     No current facility-administered medications for this visit.          ROS   Review of Systems   Constitutional:  Negative for chills, fatigue and fever.   HENT:  Negative for ear pain, hearing loss, sore throat and trouble swallowing.    Eyes: Negative.    Respiratory:  Negative for cough, chest tightness, shortness of breath and wheezing.    Cardiovascular:  Negative for chest pain, palpitations and leg swelling.   Gastrointestinal:  Negative for abdominal pain, blood in stool, constipation, diarrhea, nausea and vomiting.   Endocrine: Negative for polydipsia, polyphagia and polyuria.   Genitourinary:  Negative for flank pain, hematuria, pelvic pain, vaginal bleeding, vaginal discharge and vaginal pain.   Musculoskeletal:  Positive for arthralgias.   Skin:  Positive for wound.   Neurological:  Negative for dizziness, syncope,

## 2024-07-31 NOTE — PROGRESS NOTES
Mandy Edwards is a 54 y.o. presents today for No chief complaint on file.      Is someone accompanying this pt? YES/DAUGHTER     Is the patient using any DME equipment during OV? NO    Depression Screenin/6/2024     3:40 PM 3/27/2024     2:48 PM   PHQ-9 Questionaire   Little interest or pleasure in doing things 1 1   Feeling down, depressed, or hopeless 1 1   Trouble falling or staying asleep, or sleeping too much  2   Feeling tired or having little energy  2   Poor appetite or overeating  1   Feeling bad about yourself - or that you are a failure or have let yourself or your family down  1   Trouble concentrating on things, such as reading the newspaper or watching television  1   Moving or speaking so slowly that other people could have noticed. Or the opposite - being so fidgety or restless that you have been moving around a lot more than usual  1   Thoughts that you would be better off dead, or of hurting yourself in some way  0   PHQ-9 Total Score 2 10   If you checked off any problems, how difficult have these problems made it for you to do your work, take care of things at home, or get along with other people?  2       Abuse Screenin/6/2024     3:00 PM 3/27/2024     2:00 PM   AMB Abuse Screening   Do you ever feel afraid of your partner? N N   Are you in a relationship with someone who physically or mentally threatens you? N N   Is it safe for you to go home? Y Y       Learning Assessment:  No question data found.    Fall Risk:       No data to display                    Coordination of Care:   1. \"Have you been to the ER, urgent care clinic since your last visit?  Hospitalized since your last visit?\" YES 2024 [facility]  ABD PAIN    2. \"Have you seen or consulted any other health care providers outside of the [facility] since your last visit?\" GEN SURGEON     3. For patients aged 45-75: Has the patient had a colonoscopy / FIT/ Cologuard? NO    If the patient is female:    4.

## 2024-08-23 ASSESSMENT — ENCOUNTER SYMPTOMS
TROUBLE SWALLOWING: 0
ABDOMINAL PAIN: 0
NAUSEA: 0
BLOOD IN STOOL: 0
SORE THROAT: 0
CONSTIPATION: 0
VOMITING: 0
COUGH: 0
WHEEZING: 0
SHORTNESS OF BREATH: 0
DIARRHEA: 0
CHEST TIGHTNESS: 0
EYES NEGATIVE: 1

## 2024-11-13 DIAGNOSIS — L73.2 HIDRADENITIS SUPPURATIVA: ICD-10-CM

## 2024-11-14 RX ORDER — SULFAMETHOXAZOLE AND TRIMETHOPRIM 800; 160 MG/1; MG/1
1 TABLET ORAL 2 TIMES DAILY
Qty: 14 TABLET | Refills: 0 | OUTPATIENT
Start: 2024-11-14 | End: 2024-11-21

## 2024-11-14 NOTE — TELEPHONE ENCOUNTER
Medication(s) requesting:   Requested Prescriptions     Pending Prescriptions Disp Refills    sulfamethoxazole-trimethoprim (BACTRIM DS;SEPTRA DS) 800-160 MG per tablet [Pharmacy Med Name: SULFAMETHOXAZOLE-TMP DS TABLET] 14 tablet 0     Sig: TAKE 1 TABLET BY MOUTH TWICE A DAY FOR 7 DAYS       Last office visit:  7.31.24  Next office visit DMA: 11/21/2024

## 2024-11-18 DIAGNOSIS — L73.2 HIDRADENITIS SUPPURATIVA: ICD-10-CM

## 2024-11-20 RX ORDER — SULFAMETHOXAZOLE AND TRIMETHOPRIM 800; 160 MG/1; MG/1
1 TABLET ORAL 2 TIMES DAILY
Qty: 14 TABLET | Refills: 0 | OUTPATIENT
Start: 2024-11-20 | End: 2024-11-27

## 2024-11-21 ENCOUNTER — OFFICE VISIT (OUTPATIENT)
Facility: CLINIC | Age: 54
End: 2024-11-21
Payer: MEDICAID

## 2024-11-21 ENCOUNTER — TELEPHONE (OUTPATIENT)
Facility: CLINIC | Age: 54
End: 2024-11-21

## 2024-11-21 VITALS
BODY MASS INDEX: 42.88 KG/M2 | OXYGEN SATURATION: 98 % | TEMPERATURE: 97.3 F | HEIGHT: 62 IN | SYSTOLIC BLOOD PRESSURE: 140 MMHG | WEIGHT: 233 LBS | HEART RATE: 84 BPM | DIASTOLIC BLOOD PRESSURE: 91 MMHG

## 2024-11-21 DIAGNOSIS — R80.9 MICROALBUMINURIA: ICD-10-CM

## 2024-11-21 DIAGNOSIS — Z13.220 SCREENING FOR LIPID DISORDERS: ICD-10-CM

## 2024-11-21 DIAGNOSIS — E56.9 VITAMIN DEFICIENCY: ICD-10-CM

## 2024-11-21 DIAGNOSIS — R73.01 IMPAIRED FASTING GLUCOSE: ICD-10-CM

## 2024-11-21 DIAGNOSIS — Z13.29 SCREENING FOR THYROID DISORDER: ICD-10-CM

## 2024-11-21 DIAGNOSIS — I10 PRIMARY HYPERTENSION: Primary | ICD-10-CM

## 2024-11-21 DIAGNOSIS — L65.9 ALOPECIA: ICD-10-CM

## 2024-11-21 PROCEDURE — 3080F DIAST BP >= 90 MM HG: CPT

## 2024-11-21 PROCEDURE — 3077F SYST BP >= 140 MM HG: CPT

## 2024-11-21 PROCEDURE — 99214 OFFICE O/P EST MOD 30 MIN: CPT

## 2024-11-21 RX ORDER — HYDROCHLOROTHIAZIDE 12.5 MG/1
12.5 CAPSULE ORAL EVERY MORNING
Qty: 30 CAPSULE | Refills: 5 | Status: SHIPPED | OUTPATIENT
Start: 2024-11-21 | End: 2024-12-05 | Stop reason: ALTCHOICE

## 2024-11-21 NOTE — PROGRESS NOTES
Mandy Edwards is a 54 y.o. year old female who presents today for   Chief Complaint   Patient presents with    Follow-up        \"Have you been to the ER, urgent care clinic since your last visit?  Hospitalized since your last visit?\"   YES - When: approximately 5 months ago.  Where and Why: throwing up blood. Hernia      “Have you seen or consulted any other health care providers outside our system since your last visit?”   NO     Have you had a mammogram?”   NO    Date of last Mammogram: 11/15/2016       “Have you had a colorectal cancer screening such as a colonoscopy/FIT/Cologuard?    NO    No colonoscopy on file  No cologuard on file  No FIT/FOBT on file   No flexible sigmoidoscopy on file           - SID Khan  [facility] [email or web address]  Phone: [phone]  Fax: [phone]

## 2024-11-22 LAB
A/G RATIO: 1.2 RATIO (ref 1.1–2.6)
ALBUMIN: 4.2 G/DL (ref 3.5–5)
ALP BLD-CCNC: 130 U/L (ref 25–115)
ALT SERPL-CCNC: 13 U/L (ref 5–40)
ANION GAP SERPL CALCULATED.3IONS-SCNC: 15 MMOL/L (ref 3–15)
AST SERPL-CCNC: 18 U/L (ref 10–37)
BACTERIA: PRESENT
BASOPHILS ABSOLUTE: 0 K/UL (ref 0–0.2)
BASOPHILS RELATIVE PERCENT: 0 % (ref 0–2)
BILIRUB SERPL-MCNC: 0.4 MG/DL (ref 0.2–1.2)
BILIRUBIN, URINE: NEGATIVE
BLOOD: NEGATIVE
BUN BLDV-MCNC: 11 MG/DL (ref 6–22)
CALCIUM SERPL-MCNC: 10.2 MG/DL (ref 8.4–10.5)
CHLORIDE BLD-SCNC: 101 MMOL/L (ref 98–110)
CHOLESTEROL, TOTAL: 230 MG/DL (ref 110–200)
CHOLESTEROL/HDL RATIO: 3.4 (ref 0–5)
CLARITY, UA: CLEAR
CO2: 24 MMOL/L (ref 20–32)
COLOR, UA: YELLOW
CREAT SERPL-MCNC: 0.6 MG/DL (ref 0.5–1.2)
CREATININE URINE: 189 MG/DL
EOSINOPHIL # BLD: 1 % (ref 0–6)
EOSINOPHILS ABSOLUTE: 0.1 K/UL (ref 0–0.5)
EPITHELIAL CELLS: ABNORMAL /HPF
ESTIMATED AVERAGE GLUCOSE: 123 MG/DL (ref 91–123)
FERRITIN: 53 NG/ML (ref 10–291)
GFR, ESTIMATED: >60 ML/MIN/1.73 SQ.M.
GLOBULIN: 3.6 G/DL (ref 2–4)
GLUCOSE URINE: NEGATIVE MG/DL
GLUCOSE: 82 MG/DL (ref 70–99)
HBA1C MFR BLD: 5.9 % (ref 4.8–5.6)
HCT VFR BLD CALC: 47.1 % (ref 35.1–48)
HDLC SERPL-MCNC: 68 MG/DL
HEMOGLOBIN: 15.2 G/DL (ref 11.7–16)
HYALINE CASTS: ABNORMAL /LPF (ref 0–2)
IRON % SATURATION: 29 % (ref 20–50)
IRON: 84 MCG/DL (ref 30–160)
KETONES, URINE: ABNORMAL MG/DL
LDL CHOLESTEROL: 147 MG/DL (ref 50–99)
LDL/HDL RATIO: 2.2
LEUKOCYTE ESTERASE, URINE: NEGATIVE
LYMPHOCYTES # BLD: 30 % (ref 20–45)
LYMPHOCYTES ABSOLUTE: 2 K/UL (ref 1–4.8)
MCH RBC QN AUTO: 30 PG (ref 26–34)
MCHC RBC AUTO-ENTMCNC: 32 G/DL (ref 31–36)
MCV RBC AUTO: 93 FL (ref 80–99)
MICROALB/CREAT RATIO (UG/MG CREAT.): 237.2 (ref 0–30)
MICROALBUMIN/CREAT 24H UR: 448.4 MG/L (ref 0.1–17)
MONOCYTES ABSOLUTE: 0.4 K/UL (ref 0.1–1)
MONOCYTES: 6 % (ref 3–12)
NEUTROPHILS ABSOLUTE: 4.1 K/UL (ref 1.8–7.7)
NEUTROPHILS: 62 % (ref 40–75)
NITRITE, URINE: NEGATIVE
NON-HDL CHOLESTEROL: 162 MG/DL
PDW BLD-RTO: 14.7 % (ref 10–15.5)
PH, URINE: 5 PH (ref 5–8)
PLATELET # BLD: 246 K/UL (ref 140–440)
PMV BLD AUTO: 12.1 FL (ref 9–13)
POTASSIUM SERPL-SCNC: 3.8 MMOL/L (ref 3.5–5.5)
PROTEIN UA: ABNORMAL MG/DL
RBC # BLD: 5.08 M/UL (ref 3.8–5.2)
RBC URINE: ABNORMAL /HPF
SODIUM BLD-SCNC: 140 MMOL/L (ref 133–145)
SPECIFIC GRAVITY UA: 1.02 (ref 1–1.03)
T3 TOTAL: 111 NG/DL (ref 80–200)
T4 FREE: 1.3 NG/DL (ref 0.9–1.8)
TOTAL IRON BINDING CAPACITY: 287 MCG/DL (ref 228–428)
TOTAL PROTEIN: 7.8 G/DL (ref 6.4–8.3)
TRIGL SERPL-MCNC: 71 MG/DL (ref 40–149)
TSH SERPL DL<=0.05 MIU/L-ACNC: 1.55 MCU/ML (ref 0.27–4.2)
UIBC: 203 MCG/DL (ref 110–370)
UROBILINOGEN, URINE: 0.2 MG/DL
VITAMIN D 25-HYDROXY: 42 NG/ML (ref 32–100)
VLDLC SERPL CALC-MCNC: 14 MG/DL (ref 8–30)
WBC # BLD: 6.5 K/UL (ref 4–11)
WBC UA: ABNORMAL /HPF (ref 0–5)

## 2024-12-05 ENCOUNTER — TELEMEDICINE (OUTPATIENT)
Facility: CLINIC | Age: 54
End: 2024-12-05
Payer: MEDICAID

## 2024-12-05 DIAGNOSIS — R52 ACUTE PAIN: ICD-10-CM

## 2024-12-05 DIAGNOSIS — I10 PRIMARY HYPERTENSION: Primary | ICD-10-CM

## 2024-12-05 PROCEDURE — 99212 OFFICE O/P EST SF 10 MIN: CPT

## 2024-12-05 RX ORDER — OXYCODONE HYDROCHLORIDE 5 MG/1
5 TABLET ORAL EVERY 6 HOURS PRN
Qty: 12 TABLET | Refills: 0 | Status: SHIPPED | OUTPATIENT
Start: 2024-12-05 | End: 2024-12-08

## 2024-12-05 RX ORDER — LISINOPRIL 20 MG/1
20 TABLET ORAL DAILY
Qty: 30 TABLET | Refills: 1 | Status: SHIPPED | OUTPATIENT
Start: 2024-12-05

## 2024-12-05 RX ORDER — HYDROCHLOROTHIAZIDE 12.5 MG/1
12.5 CAPSULE ORAL EVERY MORNING
Qty: 30 CAPSULE | Refills: 5 | Status: SHIPPED | OUTPATIENT
Start: 2024-12-05

## 2024-12-05 RX ORDER — NITROFURANTOIN 25; 75 MG/1; MG/1
100 CAPSULE ORAL 2 TIMES DAILY
Qty: 20 CAPSULE | Refills: 0 | Status: SHIPPED | OUTPATIENT
Start: 2024-12-05 | End: 2024-12-15

## 2024-12-05 NOTE — PROGRESS NOTES
Amount: 20 mg, Disp-12 tablet, R-0Normal           Return in about 1 month (around 1/5/2025) for HTN.      Patient was evaluated through a synchronous (real-time) audio-video encounter. The patient (or guardian if applicable) is aware that this is a billable service, which includes applicable co-pays. This Virtual Visit was conducted with patient's (and/or legal guardian's) consent. The visit was conducted pursuant to the emergency declaration under the Pace Act and the National Emergencies Act, 1135 waiver authority and the Coronavirus Preparedness and Response Supplemental Appropriations Act.  Patient identification was verified, and a caregiver was present when appropriate.  The patient was located at: Home: [address] [place] [address] [address] [address]  The provider was located at: Facility (Appt Dept): [address] [facility]  [place] [address]       I have discussed the diagnosis with the patient and the intended plan as seen in the above orders, as well as medication side effects and warnings.  The plan of care was reviewed with the patient, accepted their input and they have verbalized their agreement with the treatment plan. Today's After-Visit Summary will be posted on [employer] portal.    Electronically signed  Carmen Schafer, Family Nurse Practitioner     Please note: This document has been created using a voice recognition software. Unrecognized errors in transcription may be present.

## 2024-12-06 ASSESSMENT — ENCOUNTER SYMPTOMS
DIARRHEA: 0
COUGH: 0
EYES NEGATIVE: 1
ABDOMINAL PAIN: 0
TROUBLE SWALLOWING: 0
SORE THROAT: 0
BLOOD IN STOOL: 0
NAUSEA: 0
SHORTNESS OF BREATH: 0
VOMITING: 0
CHEST TIGHTNESS: 0
WHEEZING: 0
CONSTIPATION: 0

## 2024-12-11 ENCOUNTER — COMMUNITY OUTREACH (OUTPATIENT)
Facility: CLINIC | Age: 54
End: 2024-12-11

## 2025-01-02 ENCOUNTER — TELEPHONE (OUTPATIENT)
Facility: CLINIC | Age: 55
End: 2025-01-02

## 2025-01-02 NOTE — TELEPHONE ENCOUNTER
pharmacy refill request recieved for       oxyCODONE (ROXICODONE) 5 MG immediate release tablet    LOV:    NOV:    Please advise    Thank you

## 2025-01-28 ENCOUNTER — TELEMEDICINE (OUTPATIENT)
Facility: CLINIC | Age: 55
End: 2025-01-28

## 2025-01-28 DIAGNOSIS — R52 ACUTE PAIN: ICD-10-CM

## 2025-01-28 DIAGNOSIS — I10 PRIMARY HYPERTENSION: Primary | ICD-10-CM

## 2025-01-28 RX ORDER — OXYCODONE HYDROCHLORIDE 5 MG/1
5 TABLET ORAL EVERY 6 HOURS PRN
Qty: 28 TABLET | Refills: 0 | Status: SHIPPED | OUTPATIENT
Start: 2025-01-28 | End: 2025-02-04

## 2025-01-28 NOTE — PROGRESS NOTES
Mandy Edwards (: 1970) is a 55 y.o. female, established  patient, here for evaluation of the following:      Subjective:     Primary historian: patient    Hypertension       HPI    She presents for follow up of HTN. She reports compliance with medication management.  She reports having increased pain due to draining lesion of HS. She is currently taking oral abtx from dermatology and has follow up appt with derm in 1 week. She denies any fatigue, no fevers, no nausea, vomiting, or confusion.    No past medical history on file.     Past Surgical History:   Procedure Laterality Date    HYSTERECTOMY, TOTAL ABDOMINAL (CERVIX REMOVED)          Current Outpatient Medications   Medication Sig Dispense Refill    lisinopril (PRINIVIL;ZESTRIL) 20 MG tablet Take 1 tablet by mouth daily 30 tablet 1    hydroCHLOROthiazide 12.5 MG capsule Take 1 capsule by mouth every morning 30 capsule 5    busPIRone (BUSPAR) 10 MG tablet       ALPRAZolam (XANAX) 0.5 MG tablet       erythromycin (ROMYCIN) 5 MG/GM ophthalmic ointment       fluticasone (FLONASE) 50 MCG/ACT nasal spray 1 spray daily as needed for Allergies      moxifloxacin (VIGAMOX) 0.5 % ophthalmic solution       escitalopram (LEXAPRO) 10 MG tablet       naloxone 4 MG/0.1ML LIQD nasal spray 1 spray by Nasal route as needed for Opioid Reversal 2 each 0     No current facility-administered medications for this visit.       ROS:    Review of Systems   Constitutional:  Negative for chills, fatigue and fever.   HENT:  Negative for ear pain, hearing loss, sore throat and trouble swallowing.    Eyes: Negative.    Respiratory:  Negative for cough, chest tightness, shortness of breath and wheezing.    Cardiovascular:  Negative for chest pain, palpitations and leg swelling.   Gastrointestinal:  Negative for abdominal pain, blood in stool, constipation, diarrhea, nausea and vomiting.   Endocrine: Negative for polydipsia, polyphagia and polyuria.   Genitourinary:  Negative for

## 2025-02-15 DIAGNOSIS — I10 PRIMARY HYPERTENSION: ICD-10-CM

## 2025-02-17 NOTE — TELEPHONE ENCOUNTER
Medication(s) requesting:   Requested Prescriptions     Pending Prescriptions Disp Refills    nitrofurantoin, macrocrystal-monohydrate, (MACROBID) 100 MG capsule [Pharmacy Med Name: NITROFURANTOIN MONO- MG] 20 capsule 0     Sig: TAKE 1 CAPSULE BY MOUTH TWICE A DAY FOR 10 DAYS       Last office visit:  1.28.25  Next office visit DMA: 2/21/2025

## 2025-02-21 RX ORDER — NITROFURANTOIN 25; 75 MG/1; MG/1
CAPSULE ORAL
Qty: 20 CAPSULE | Refills: 0 | OUTPATIENT
Start: 2025-02-21

## 2025-02-23 PROBLEM — I10 PRIMARY HYPERTENSION: Status: ACTIVE | Noted: 2025-02-23

## 2025-03-26 DIAGNOSIS — I10 PRIMARY HYPERTENSION: ICD-10-CM

## 2025-03-26 RX ORDER — NITROFURANTOIN 25; 75 MG/1; MG/1
CAPSULE ORAL
Qty: 20 CAPSULE | Refills: 0 | OUTPATIENT
Start: 2025-03-26

## 2025-03-26 NOTE — TELEPHONE ENCOUNTER
Medication(s) requesting:   Requested Prescriptions     Pending Prescriptions Disp Refills    nitrofurantoin, macrocrystal-monohydrate, (MACROBID) 100 MG capsule [Pharmacy Med Name: NITROFURANTOIN MONO- MG] 20 capsule 0     Sig: TAKE 1 CAPSULE BY MOUTH TWICE A DAY FOR 10 DAYS       Last office visit:  1.28.25  Next office visit DMA: Visit date not found

## 2025-04-02 ENCOUNTER — TELEPHONE (OUTPATIENT)
Facility: CLINIC | Age: 55
End: 2025-04-02

## 2025-04-02 NOTE — TELEPHONE ENCOUNTER
Called and lvm to schedule the pt for a follow up appt per [employer] request.    Please follow up if the pt returns the call.

## 2025-05-14 ENCOUNTER — OFFICE VISIT (OUTPATIENT)
Facility: CLINIC | Age: 55
End: 2025-05-14
Payer: MEDICAID

## 2025-05-14 VITALS
WEIGHT: 241 LBS | TEMPERATURE: 97.7 F | RESPIRATION RATE: 18 BRPM | BODY MASS INDEX: 44.35 KG/M2 | OXYGEN SATURATION: 97 % | HEART RATE: 87 BPM | HEIGHT: 62 IN | SYSTOLIC BLOOD PRESSURE: 145 MMHG | DIASTOLIC BLOOD PRESSURE: 91 MMHG

## 2025-05-14 DIAGNOSIS — D50.8 OTHER IRON DEFICIENCY ANEMIA: ICD-10-CM

## 2025-05-14 DIAGNOSIS — E53.8 B12 DEFICIENCY: ICD-10-CM

## 2025-05-14 DIAGNOSIS — R42 VERTIGO: ICD-10-CM

## 2025-05-14 DIAGNOSIS — K21.9 GASTROESOPHAGEAL REFLUX DISEASE WITHOUT ESOPHAGITIS: ICD-10-CM

## 2025-05-14 DIAGNOSIS — Z91.89 AT HIGH RISK FOR BREAST CANCER: ICD-10-CM

## 2025-05-14 DIAGNOSIS — Z13.89 SCREENING FOR BLOOD OR PROTEIN IN URINE: ICD-10-CM

## 2025-05-14 DIAGNOSIS — Z13.29 SCREENING FOR THYROID DISORDER: ICD-10-CM

## 2025-05-14 DIAGNOSIS — I10 PRIMARY HYPERTENSION: ICD-10-CM

## 2025-05-14 DIAGNOSIS — R73.01 IMPAIRED FASTING GLUCOSE: ICD-10-CM

## 2025-05-14 DIAGNOSIS — E55.9 VITAMIN D DEFICIENCY: ICD-10-CM

## 2025-05-14 DIAGNOSIS — R80.9 MICROALBUMINURIA: Primary | ICD-10-CM

## 2025-05-14 DIAGNOSIS — Z77.120 MOLD EXPOSURE: ICD-10-CM

## 2025-05-14 LAB
A/G RATIO: 1.1 RATIO (ref 1.1–2.6)
ALBUMIN: 4.2 G/DL (ref 3.5–5)
ALP BLD-CCNC: 140 U/L (ref 25–115)
ALT SERPL-CCNC: 12 U/L (ref 5–40)
ANION GAP SERPL CALCULATED.3IONS-SCNC: 12 MMOL/L (ref 3–15)
AST SERPL-CCNC: 15 U/L (ref 10–37)
BACTERIA, URINE: NEGATIVE
BASOPHILS # BLD: 0 % (ref 0–2)
BASOPHILS ABSOLUTE: 0 K/UL (ref 0–0.2)
BILIRUB SERPL-MCNC: 0.3 MG/DL (ref 0.2–1.2)
BILIRUBIN, URINE: NEGATIVE
BUN BLDV-MCNC: 13 MG/DL (ref 6–22)
CALCIUM SERPL-MCNC: 10.3 MG/DL (ref 8.4–10.5)
CHLORIDE BLD-SCNC: 100 MMOL/L (ref 98–110)
CLARITY, UA: CLEAR
CO2: 27 MMOL/L (ref 20–32)
COLOR, UA: YELLOW
CREAT SERPL-MCNC: 0.8 MG/DL (ref 0.5–1.2)
EOSINOPHIL # BLD: 1 % (ref 0–6)
EOSINOPHILS ABSOLUTE: 0.1 K/UL (ref 0–0.5)
ESTIMATED AVERAGE GLUCOSE: 111 MG/DL (ref 91–123)
FERRITIN: 47 NG/ML (ref 10–291)
GFR, ESTIMATED: 81.3 ML/MIN/1.73 SQ.M.
GLOBULIN: 3.8 G/DL (ref 2–4)
GLUCOSE URINE: NEGATIVE MG/DL
GLUCOSE: 83 MG/DL (ref 70–99)
HBA1C MFR BLD: 5.5 % (ref 4.8–5.6)
HCT VFR BLD CALC: 45.4 % (ref 35.1–48)
HEMOGLOBIN: 14.7 G/DL (ref 11.7–16)
HYALINE CASTS: ABNORMAL /LPF (ref 0–2)
IRON % SATURATION: 24 % (ref 20–50)
IRON: 65 MCG/DL (ref 30–160)
KETONES, URINE: NEGATIVE MG/DL
LEUKOCYTE ESTERASE, URINE: NEGATIVE
LYMPHOCYTES # BLD: 26 % (ref 20–45)
LYMPHOCYTES ABSOLUTE: 2 K/UL (ref 1–4.8)
MCH RBC QN AUTO: 29 PG (ref 26–34)
MCHC RBC AUTO-ENTMCNC: 32 G/DL (ref 31–36)
MCV RBC AUTO: 91 FL (ref 80–99)
MONOCYTES ABSOLUTE: 0.6 K/UL (ref 0.1–1)
MONOCYTES: 7 % (ref 3–12)
NEUTROPHILS ABSOLUTE: 5 K/UL (ref 1.8–7.7)
NEUTROPHILS SEGMENTED: 64 % (ref 40–75)
NITRITE, URINE: NEGATIVE
OCCULT BLOOD,URINE: NEGATIVE
PDW BLD-RTO: 14 % (ref 10–15.5)
PH, URINE: 5.5 PH (ref 5–8)
PLATELET # BLD: 285 K/UL (ref 140–440)
PMV BLD AUTO: 11.4 FL (ref 9–13)
POTASSIUM SERPL-SCNC: 4.5 MMOL/L (ref 3.5–5.5)
PROTEIN, URINE: ABNORMAL MG/DL
RBC # BLD: 5.01 M/UL (ref 3.8–5.2)
RBC URINE: ABNORMAL /HPF
SODIUM BLD-SCNC: 139 MMOL/L (ref 133–145)
SPECIFIC GRAVITY UA: 1.02 (ref 1–1.03)
SQUAMOUS EPITHELIAL CELLS: ABNORMAL /HPF
T4 FREE: 1.3 NG/DL (ref 0.9–1.8)
TOTAL IRON BINDING CAPACITY: 273 MCG/DL (ref 228–428)
TOTAL PROTEIN: 8 G/DL (ref 6.4–8.3)
TSH SERPL DL<=0.05 MIU/L-ACNC: 1.1 MCU/ML (ref 0.27–4.2)
UNSATURATED IRON BINDING CAPACITY: 208 MCG/DL (ref 110–370)
UROBILINOGEN, URINE: 0.2 MG/DL
VITAMIN B-12: 633 PG/ML (ref 211–911)
VITAMIN D 25-HYDROXY: 47 NG/ML (ref 32–100)
WBC # BLD: 7.7 K/UL (ref 4–11)
WBC URINE: ABNORMAL /HPF (ref 0–5)

## 2025-05-14 PROCEDURE — 99214 OFFICE O/P EST MOD 30 MIN: CPT

## 2025-05-14 PROCEDURE — 3080F DIAST BP >= 90 MM HG: CPT

## 2025-05-14 PROCEDURE — 3077F SYST BP >= 140 MM HG: CPT

## 2025-05-14 RX ORDER — HYDROXYZINE HYDROCHLORIDE 25 MG/1
25 TABLET, FILM COATED ORAL NIGHTLY PRN
COMMUNITY
Start: 2025-04-22

## 2025-05-14 RX ORDER — SCOPOLAMINE 1 MG/3D
1 PATCH, EXTENDED RELEASE TRANSDERMAL
Qty: 10 PATCH | Refills: 3 | Status: SHIPPED | OUTPATIENT
Start: 2025-05-14

## 2025-05-14 RX ORDER — MECLIZINE HYDROCHLORIDE 25 MG/1
25 TABLET ORAL 3 TIMES DAILY PRN
COMMUNITY
Start: 2025-04-29

## 2025-05-14 RX ORDER — FLUOCINOLONE ACETONIDE 0.11 MG/ML
OIL TOPICAL
COMMUNITY
Start: 2025-05-07

## 2025-05-14 RX ORDER — CLINDAMYCIN HYDROCHLORIDE 300 MG/1
300 CAPSULE ORAL 2 TIMES DAILY
COMMUNITY
Start: 2025-05-05

## 2025-05-14 RX ORDER — AMLODIPINE BESYLATE 5 MG/1
5 TABLET ORAL DAILY
COMMUNITY
Start: 2024-06-13

## 2025-05-14 RX ORDER — ESCITALOPRAM OXALATE 20 MG/1
20 TABLET ORAL DAILY
COMMUNITY
Start: 2025-05-06

## 2025-05-14 RX ORDER — OMEPRAZOLE 20 MG/1
20 CAPSULE, DELAYED RELEASE ORAL
Qty: 180 CAPSULE | Refills: 1 | Status: SHIPPED | OUTPATIENT
Start: 2025-05-14

## 2025-05-14 RX ORDER — METOCLOPRAMIDE 10 MG/1
10 TABLET ORAL 3 TIMES DAILY PRN
COMMUNITY
Start: 2025-04-29

## 2025-05-14 SDOH — ECONOMIC STABILITY: FOOD INSECURITY: WITHIN THE PAST 12 MONTHS, YOU WORRIED THAT YOUR FOOD WOULD RUN OUT BEFORE YOU GOT MONEY TO BUY MORE.: NEVER TRUE

## 2025-05-14 SDOH — ECONOMIC STABILITY: FOOD INSECURITY: WITHIN THE PAST 12 MONTHS, THE FOOD YOU BOUGHT JUST DIDN'T LAST AND YOU DIDN'T HAVE MONEY TO GET MORE.: NEVER TRUE

## 2025-05-14 ASSESSMENT — PATIENT HEALTH QUESTIONNAIRE - PHQ9
SUM OF ALL RESPONSES TO PHQ QUESTIONS 1-9: 12
9. THOUGHTS THAT YOU WOULD BE BETTER OFF DEAD, OR OF HURTING YOURSELF: NOT AT ALL
SUM OF ALL RESPONSES TO PHQ QUESTIONS 1-9: 12
7. TROUBLE CONCENTRATING ON THINGS, SUCH AS READING THE NEWSPAPER OR WATCHING TELEVISION: SEVERAL DAYS
4. FEELING TIRED OR HAVING LITTLE ENERGY: MORE THAN HALF THE DAYS
SUM OF ALL RESPONSES TO PHQ QUESTIONS 1-9: 12
2. FEELING DOWN, DEPRESSED OR HOPELESS: SEVERAL DAYS
8. MOVING OR SPEAKING SO SLOWLY THAT OTHER PEOPLE COULD HAVE NOTICED. OR THE OPPOSITE, BEING SO FIGETY OR RESTLESS THAT YOU HAVE BEEN MOVING AROUND A LOT MORE THAN USUAL: SEVERAL DAYS
10. IF YOU CHECKED OFF ANY PROBLEMS, HOW DIFFICULT HAVE THESE PROBLEMS MADE IT FOR YOU TO DO YOUR WORK, TAKE CARE OF THINGS AT HOME, OR GET ALONG WITH OTHER PEOPLE: VERY DIFFICULT
1. LITTLE INTEREST OR PLEASURE IN DOING THINGS: MORE THAN HALF THE DAYS
SUM OF ALL RESPONSES TO PHQ QUESTIONS 1-9: 12
5. POOR APPETITE OR OVEREATING: SEVERAL DAYS
6. FEELING BAD ABOUT YOURSELF - OR THAT YOU ARE A FAILURE OR HAVE LET YOURSELF OR YOUR FAMILY DOWN: SEVERAL DAYS
3. TROUBLE FALLING OR STAYING ASLEEP: NEARLY EVERY DAY

## 2025-05-14 NOTE — PROGRESS NOTES
Mandy Edwards is a 55 y.o. year old female who presents today for   Chief Complaint   Patient presents with    Hypertension    Dizziness       \"Have you been to the ER, urgent care clinic since your last visit?  Hospitalized since your last visit?\"    Yes -- ER for abdominal pain, anxiety attack     “Have you seen or consulted any other health care providers outside our system since your last visit?”    no    Have you had a mammogram?”   NO    Date of last Mammogram: 11/15/2016       “Have you had a colorectal cancer screening such as a colonoscopy/FIT/Cologuard?    NO    No colonoscopy on file  No cologuard on file  No FIT/FOBT on file   No flexible sigmoidoscopy on file         Click Here for Release of Records Request    - Mary Cullipher, LPN  [facility]  Phone: [phone]  Fax: [phone]

## 2025-05-14 NOTE — PROGRESS NOTES
Patient ID: Mandy Edwards is a 55 y.o. female established patient presents for the following:      Subjective:     Primary historian: patient    Hypertension and Dizziness       HPI       5/14/2025     1:05 PM 6/6/2024     3:40 PM 3/27/2024     2:48 PM   PHQ-9    Little interest or pleasure in doing things 2 1 1   Feeling down, depressed, or hopeless 1 1 1   Trouble falling or staying asleep, or sleeping too much 3  2   Feeling tired or having little energy 2  2   Poor appetite or overeating 1  1   Feeling bad about yourself - or that you are a failure or have let yourself or your family down 1  1   Trouble concentrating on things, such as reading the newspaper or watching television 1  1   Moving or speaking so slowly that other people could have noticed. Or the opposite - being so fidgety or restless that you have been moving around a lot more than usual 1  1   Thoughts that you would be better off dead, or of hurting yourself in some way 0  0   PHQ-2 Score 3 2 2   PHQ-9 Total Score 12 2 10   If you checked off any problems, how difficult have these problems made it for you to do your work, take care of things at home, or get along with other people? 2  2          History reviewed. No pertinent past medical history.    Past Surgical History:   Procedure Laterality Date    HYSTERECTOMY, TOTAL ABDOMINAL (CERVIX REMOVED)  2018       Current Outpatient Medications   Medication Sig Dispense Refill    amLODIPine (NORVASC) 5 MG tablet Take 1 tablet by mouth daily      clindamycin (CLEOCIN) 300 MG capsule Take 1 capsule by mouth 2 times daily      hydrOXYzine HCl (ATARAX) 25 MG tablet Take 1 tablet by mouth nightly as needed for Anxiety      meclizine (ANTIVERT) 25 MG tablet Take 1 tablet by mouth 3 times daily as needed      metoclopramide (REGLAN) 10 MG tablet Take 1 tablet by mouth 3 times daily as needed      escitalopram (LEXAPRO) 20 MG tablet Take 1 tablet by mouth daily      fluocinolone (DERMA-SMOOTHE) 0.01 %

## 2025-05-16 LAB — ALLERGEN ASPERGILLUS FUMIGATUS IGE: <0.1 KU/L

## 2025-05-23 ENCOUNTER — TELEMEDICINE (OUTPATIENT)
Facility: CLINIC | Age: 55
End: 2025-05-23
Payer: MEDICAID

## 2025-05-23 DIAGNOSIS — R73.01 IMPAIRED FASTING GLUCOSE: ICD-10-CM

## 2025-05-23 DIAGNOSIS — I10 PRIMARY HYPERTENSION: Primary | ICD-10-CM

## 2025-05-23 PROCEDURE — 99214 OFFICE O/P EST MOD 30 MIN: CPT

## 2025-05-23 RX ORDER — SERTRALINE HYDROCHLORIDE 100 MG/1
100 TABLET, FILM COATED ORAL DAILY
COMMUNITY
Start: 2025-05-22

## 2025-05-23 ASSESSMENT — PATIENT HEALTH QUESTIONNAIRE - PHQ9
SUM OF ALL RESPONSES TO PHQ QUESTIONS 1-9: 2
SUM OF ALL RESPONSES TO PHQ QUESTIONS 1-9: 2
1. LITTLE INTEREST OR PLEASURE IN DOING THINGS: SEVERAL DAYS
SUM OF ALL RESPONSES TO PHQ QUESTIONS 1-9: 2
SUM OF ALL RESPONSES TO PHQ QUESTIONS 1-9: 2
2. FEELING DOWN, DEPRESSED OR HOPELESS: SEVERAL DAYS

## 2025-05-23 NOTE — PROGRESS NOTES
Mandy Edwards (: 1970) is a 55 y.o. female, established  patient, here for evaluation of the following:      Subjective:     Primary historian: patient    Dizziness (Says today has been a good day) and Hypertension       History reviewed. No pertinent past medical history.     Past Surgical History:   Procedure Laterality Date    HYSTERECTOMY, TOTAL ABDOMINAL (CERVIX REMOVED)  2018        Current Outpatient Medications   Medication Sig Dispense Refill    sertraline (ZOLOFT) 100 MG tablet Take 1 tablet by mouth daily      amLODIPine (NORVASC) 5 MG tablet Take 1 tablet by mouth daily      clindamycin (CLEOCIN) 300 MG capsule Take 1 capsule by mouth 2 times daily      hydrOXYzine HCl (ATARAX) 25 MG tablet Take 1 tablet by mouth nightly as needed for Anxiety      meclizine (ANTIVERT) 25 MG tablet Take 1 tablet by mouth 3 times daily as needed      metoclopramide (REGLAN) 10 MG tablet Take 1 tablet by mouth 3 times daily as needed      escitalopram (LEXAPRO) 20 MG tablet Take 1 tablet by mouth daily      fluocinolone (DERMA-SMOOTHE) 0.01 % external oil APPLY AND GENTLY MASSAGE INTO AFFECTED AREA(S) ON SCALP ONCE DAILY AS NEEDED      scopolamine (TRANSDERM-SCOP) transdermal patch Place 1 patch onto the skin every 72 hours 10 patch 3    omeprazole (PRILOSEC) 20 MG delayed release capsule Take 1 capsule by mouth 2 times daily (before meals) 180 capsule 1    lisinopril (PRINIVIL;ZESTRIL) 20 MG tablet Take 1 tablet by mouth daily 30 tablet 1    hydroCHLOROthiazide 12.5 MG capsule Take 1 capsule by mouth every morning 30 capsule 5    busPIRone (BUSPAR) 10 MG tablet       ALPRAZolam (XANAX) 0.5 MG tablet       erythromycin (ROMYCIN) 5 MG/GM ophthalmic ointment       fluticasone (FLONASE) 50 MCG/ACT nasal spray 1 spray daily as needed for Allergies      moxifloxacin (VIGAMOX) 0.5 % ophthalmic solution       naloxone 4 MG/0.1ML LIQD nasal spray 1 spray by Nasal route as needed for Opioid Reversal 2 each 0     No current

## 2025-05-23 NOTE — PROGRESS NOTES
Mandy Edwards is a 55 y.o. year old female who presents today for   Chief Complaint   Patient presents with    Dizziness     Says today has been a good day    Hypertension       \"Have you been to the ER, urgent care clinic since your last visit?  Hospitalized since your last visit?\"    no    “Have you seen or consulted any other health care providers outside our system since your last visit?”    no    Have you had a mammogram?”   NO    Date of last Mammogram: 11/15/2016       “Have you had a colorectal cancer screening such as a colonoscopy/FIT/Cologuard?    NO    No colonoscopy on file  No cologuard on file  No FIT/FOBT on file   No flexible sigmoidoscopy on file         Click Here for Release of Records Request    - Mary Cullipher, LPN  [facility]  Phone: [phone]  Fax: [phone]

## 2025-06-02 ASSESSMENT — ENCOUNTER SYMPTOMS
NAUSEA: 0
TROUBLE SWALLOWING: 0
BLOOD IN STOOL: 0
CHEST TIGHTNESS: 0
ABDOMINAL PAIN: 0
VOMITING: 0
SORE THROAT: 0
EYES NEGATIVE: 1
DIARRHEA: 0
COUGH: 0
SHORTNESS OF BREATH: 0
CONSTIPATION: 0
WHEEZING: 0

## 2025-06-02 NOTE — ASSESSMENT & PLAN NOTE
Hemoglobin A1C   Date Value Ref Range Status   05/14/2025 5.5 4.8 - 5.6 % Final    Impaired fasting glucose:  - If you have a diagnosis of impaired fasting glucose, or prediabetes, this means that you are at higher risk of developing diabetes  - Biannual A1C check if remaining stable and well controlled.  - Lifestyle modifications include eating a diet that has lots of vegetables, fruits, beans, nuts, and whole grains. Limits red and processed meats, unhealthy fats, sugar, salt, and alcohol.  - Increase your exercise  - If you get symptoms of unexplained drowsiness, frequent urination, constant hunger, or increased thirst, please notify your provider.

## 2025-07-10 ENCOUNTER — TELEMEDICINE (OUTPATIENT)
Facility: CLINIC | Age: 55
End: 2025-07-10
Payer: MEDICAID

## 2025-07-10 DIAGNOSIS — Z12.11 COLON CANCER SCREENING: ICD-10-CM

## 2025-07-10 DIAGNOSIS — I10 PRIMARY HYPERTENSION: Primary | ICD-10-CM

## 2025-07-10 DIAGNOSIS — M17.0 ARTHRITIS OF BOTH KNEES: ICD-10-CM

## 2025-07-10 PROCEDURE — 99214 OFFICE O/P EST MOD 30 MIN: CPT

## 2025-07-10 RX ORDER — AMLODIPINE BESYLATE 5 MG/1
5 TABLET ORAL DAILY
Qty: 90 TABLET | Refills: 1 | Status: SHIPPED | OUTPATIENT
Start: 2025-07-10

## 2025-07-10 NOTE — PROGRESS NOTES
R-1Normal  2. Arthritis of both knees  -     BS - [facility], [place] ([facility])  3. Colon cancer screening  -     Cologuard (Fecal DNA Colorectal Cancer Screening)           Return in about 1 month (around 8/10/2025) for HTN.      Patient was evaluated through a synchronous (real-time) audio-video encounter. The patient (or guardian if applicable) is aware that this is a billable service, which includes applicable co-pays. This Virtual Visit was conducted with patient's (and/or legal guardian's) consent. The visit was conducted pursuant to the emergency declaration under the Pace Act and the National Emergencies Act, 1135 waiver authority and the Coronavirus Preparedness and Response Supplemental Appropriations Act.  Patient identification was verified, and a caregiver was present when appropriate.  The patient was located at: Home: [address] [place] [address] [address]  [facility] [address]  The provider was located at: Facility (Appt Dept): [address], [address]  [place] [address]       I have discussed the diagnosis with the patient and the intended plan as seen in the above orders, as well as medication side effects and warnings.  The plan of care was reviewed with the patient, accepted their input and they have verbalized their agreement with the treatment plan. Today's After-Visit Summary will be posted on [employer] portal.    Electronically signed  Carmen Schafer, Family Nurse Practitioner     Please note: This document has been created using a voice recognition software. Unrecognized errors in transcription may be present.